# Patient Record
Sex: FEMALE | Race: OTHER
[De-identification: names, ages, dates, MRNs, and addresses within clinical notes are randomized per-mention and may not be internally consistent; named-entity substitution may affect disease eponyms.]

---

## 2021-09-15 PROBLEM — Z00.00 ENCOUNTER FOR PREVENTIVE HEALTH EXAMINATION: Status: ACTIVE | Noted: 2021-09-15

## 2021-09-27 ENCOUNTER — APPOINTMENT (OUTPATIENT)
Dept: SURGERY | Facility: CLINIC | Age: 61
End: 2021-09-27
Payer: COMMERCIAL

## 2021-09-27 DIAGNOSIS — Z78.0 ASYMPTOMATIC MENOPAUSAL STATE: ICD-10-CM

## 2021-09-27 DIAGNOSIS — Z86.39 PERSONAL HISTORY OF OTHER ENDOCRINE, NUTRITIONAL AND METABOLIC DISEASE: ICD-10-CM

## 2021-09-27 DIAGNOSIS — Z86.79 PERSONAL HISTORY OF OTHER DISEASES OF THE CIRCULATORY SYSTEM: ICD-10-CM

## 2021-09-27 DIAGNOSIS — I10 ESSENTIAL (PRIMARY) HYPERTENSION: ICD-10-CM

## 2021-09-27 PROCEDURE — 88305 TISSUE EXAM BY PATHOLOGIST: CPT | Mod: 26

## 2021-09-27 PROCEDURE — 99072 ADDL SUPL MATRL&STAF TM PHE: CPT

## 2021-09-27 PROCEDURE — 11404 EXC TR-EXT B9+MARG 3.1-4 CM: CPT

## 2021-09-27 PROCEDURE — 99203 OFFICE O/P NEW LOW 30 MIN: CPT | Mod: 25

## 2021-09-27 RX ORDER — LEVOTHYROXINE SODIUM 0.17 MG/1
TABLET ORAL
Refills: 0 | Status: ACTIVE | COMMUNITY

## 2021-09-27 RX ORDER — CLOBETASOL PROPIONATE 0.5 MG/G
0.05 AEROSOL, FOAM TOPICAL
Refills: 0 | Status: ACTIVE | COMMUNITY

## 2021-09-27 RX ORDER — NITROFURANTOIN MONOHYDRATE/MACROCRYSTALLINE 25; 75 MG/1; MG/1
100 CAPSULE ORAL
Refills: 0 | Status: ACTIVE | COMMUNITY

## 2021-09-27 RX ORDER — SIMVASTATIN 40 MG/1
TABLET, FILM COATED ORAL
Refills: 0 | Status: ACTIVE | COMMUNITY

## 2021-09-27 RX ORDER — CHOLECALCIFEROL (VITAMIN D3) 25 MCG
TABLET ORAL
Refills: 0 | Status: ACTIVE | COMMUNITY

## 2021-09-27 RX ORDER — LISINOPRIL 10 MG/1
10 TABLET ORAL
Refills: 0 | Status: ACTIVE | COMMUNITY

## 2021-09-27 NOTE — PHYSICAL EXAM
[Respiratory Effort] : normal respiratory effort [Normal Rate and Rhythm] : normal rate and rhythm [de-identified] : NAD, well-appearing [de-identified] : Anicteric [de-identified] : soft, nondistended  [de-identified] : ~ 3cm mobile soft tissue mass just superior to right elbow

## 2021-09-27 NOTE — HISTORY OF PRESENT ILLNESS
[de-identified] : 59 yo F referred by Dr. Espinoza for evaluation of right arm mass. The patient has had a mass near her elbow for many years but feels it has started to enlarge. She denies pain, drainage, changes in overlying skin.

## 2021-09-27 NOTE — PHYSICAL EXAM
[Respiratory Effort] : normal respiratory effort [Normal Rate and Rhythm] : normal rate and rhythm [de-identified] : NAD, well-appearing [de-identified] : Anicteric [de-identified] : soft, nondistended  [de-identified] : ~ 3cm mobile soft tissue mass just superior to right elbow

## 2021-09-27 NOTE — CONSULT LETTER
[Dear  ___] : Dear  [unfilled], [( Thank you for referring [unfilled] for consultation for _____ )] : Thank you for referring [unfilled] for consultation for [unfilled] [Consult Closing:] : Thank you very much for allowing me to participate in the care of this patient.  If you have any questions, please do not hesitate to contact me. [Sincerely,] : Sincerely, [FreeTextEntry1] : Enclosed please find my evaluation and procedure notes. [FreeTextEntry3] : Keyona Kong MD

## 2021-09-27 NOTE — ASSESSMENT
[FreeTextEntry1] : 59 yo F with ~ 3cm right arm mass, likely lipoma. \par Discussed excision given growth.\par Risks and benefits discussed.\par Patient in agreement to proceed.\par See procedure note\par Follow up in 2 weeks

## 2021-09-27 NOTE — PROCEDURE
[FreeTextEntry1] : After informed consent was signed by the patient and witnessed by the medical assistant, the area was prepped with chloroprep and 20cc of of 1% lidocaine with epinephrine were used to anesthetize the skin. A 15 blade was then used to incise the skin overlying the mass. Dissection was carried out through the dermis sharply and the subcutaneous 3.5cm mass was dissected out using a hemostat and Metzenbaum scissors. Once the specimen was freed, it was sent labeled to pathology in [place]. The wound bed was inspected for hemostasis controlled with cautery. Once hemostasis was adequate, the wound was closed in 2 layers with 3-0 and 4-0 Vicryl suture. The wound was dressed with steristrips, gauze and tegaderm. An ace bandage was applied for pressure. The patient tolerated the procedure well.

## 2021-09-27 NOTE — ASSESSMENT
[FreeTextEntry1] : 61 yo F with ~ 3cm right arm mass, likely lipoma. \par Discussed excision given growth.\par Risks and benefits discussed.\par Patient in agreement to proceed.\par See procedure note\par Follow up in 2 weeks

## 2021-09-27 NOTE — HISTORY OF PRESENT ILLNESS
[de-identified] : 61 yo F referred by Dr. Espinoza for evaluation of right arm mass. The patient has had a mass near her elbow for many years but feels it has started to enlarge. She denies pain, drainage, changes in overlying skin.

## 2021-10-11 ENCOUNTER — APPOINTMENT (OUTPATIENT)
Dept: SURGERY | Facility: CLINIC | Age: 61
End: 2021-10-11
Payer: COMMERCIAL

## 2021-10-11 VITALS
HEART RATE: 89 BPM | TEMPERATURE: 97.7 F | DIASTOLIC BLOOD PRESSURE: 74 MMHG | SYSTOLIC BLOOD PRESSURE: 109 MMHG | WEIGHT: 125 LBS | BODY MASS INDEX: 22.15 KG/M2 | HEIGHT: 63 IN

## 2021-10-11 DIAGNOSIS — D17.21 BENIGN LIPOMATOUS NEOPLASM OF SKIN AND SUBCUTANEOUS TISSUE OF RIGHT ARM: ICD-10-CM

## 2021-10-11 PROCEDURE — 99212 OFFICE O/P EST SF 10 MIN: CPT

## 2021-10-11 PROCEDURE — 99072 ADDL SUPL MATRL&STAF TM PHE: CPT

## 2021-10-11 NOTE — CONSULT LETTER
[Dear  ___] : Dear  [unfilled], [Courtesy Letter:] : I had the pleasure of seeing your patient, [unfilled], in my office today. [Consult Closing:] : Thank you very much for allowing me to participate in the care of this patient.  If you have any questions, please do not hesitate to contact me. [Sincerely,] : Sincerely, [FreeTextEntry1] : Enclosed please find the pathology report and postop visit note. [FreeTextEntry3] : Keyona Kong MD

## 2021-10-11 NOTE — HISTORY OF PRESENT ILLNESS
[de-identified] : Patient returns s/p right arm lipoma excision. Pathology was benign. She denies pain, drainage or erythema around incision.